# Patient Record
Sex: MALE | Race: ASIAN | NOT HISPANIC OR LATINO | URBAN - METROPOLITAN AREA
[De-identification: names, ages, dates, MRNs, and addresses within clinical notes are randomized per-mention and may not be internally consistent; named-entity substitution may affect disease eponyms.]

---

## 2020-10-05 ENCOUNTER — OUTPATIENT (OUTPATIENT)
Dept: OUTPATIENT SERVICES | Facility: HOSPITAL | Age: 25
LOS: 1 days | End: 2020-10-05

## 2020-10-05 VITALS
DIASTOLIC BLOOD PRESSURE: 80 MMHG | WEIGHT: 225.09 LBS | RESPIRATION RATE: 17 BRPM | OXYGEN SATURATION: 99 % | TEMPERATURE: 98 F | HEIGHT: 70 IN | HEART RATE: 72 BPM | SYSTOLIC BLOOD PRESSURE: 130 MMHG

## 2020-10-05 DIAGNOSIS — Z01.818 ENCOUNTER FOR OTHER PREPROCEDURAL EXAMINATION: ICD-10-CM

## 2020-10-05 DIAGNOSIS — D17.0 BENIGN LIPOMATOUS NEOPLASM OF SKIN AND SUBCUTANEOUS TISSUE OF HEAD, FACE AND NECK: ICD-10-CM

## 2020-10-05 DIAGNOSIS — D17.9 BENIGN LIPOMATOUS NEOPLASM, UNSPECIFIED: ICD-10-CM

## 2020-10-05 PROBLEM — Z00.00 ENCOUNTER FOR PREVENTIVE HEALTH EXAMINATION: Status: ACTIVE | Noted: 2020-10-05

## 2020-10-05 NOTE — H&P PST ADULT - NSICDXPROBLEM_GEN_ALL_CORE_FT
PROBLEM DIAGNOSES  Problem: Benign lipomatous neoplasm  Assessment and Plan: Preop instructions provided including npo status, Hibiclens wash for infection control and GI prophylasix. Pt aware to stop any NSAIDS, OTC herbals or MVI on 10/5/20. Verbilized understanding. BW done, pending results. DVT prophylasix as per primary team. States performed covid testing today at [facility] location.        PROBLEM DIAGNOSES  Problem: Benign lipomatous neoplasm  Assessment and Plan: Preop instructions provided including npo status, Hibiclens wash for infection control and GI prophylasix. Pt aware to stop any NSAIDS, OTC herbals or MVI on 10/5/20. Verbilized understanding. BW done, pending results. DVT prophylasix as per primary team. States went for covid testing today at [facility] location as arranged and told not to be on system so test not done. Pt. will call surgeons office tomorrow to get done asap.

## 2020-10-05 NOTE — H&P PST ADULT - NSANTHOSAYNRD_GEN_A_CORE
No. SHERMAN screening performed.  STOP BANG Legend: 0-2 = LOW Risk; 3-4 = INTERMEDIATE Risk; 5-8 = HIGH Risk/Denies sleep studies done in the past

## 2020-10-05 NOTE — H&P PST ADULT - HISTORY OF PRESENT ILLNESS
25 y/o male, presents to PST w/ a preop dx of benign lipomatous neoplasm of skin and subcutaneous tissue of head, face and neck and to be evaluated for a scheduled excision seb cyst scalp 6 cm on 10/8/20. Pt states 1 mo ago noted a cyst to head, itchy and antifungal med provided, ended 1 wk ago. Non tender, noted increase in size. seen by pcp and referred to Dr Lara and recommended surgical intervention at this time.

## 2020-10-05 NOTE — H&P PST ADULT - ASSESSMENT
DX: benign lipomatous neoplasm of skin and subcutaneous tissue of head, face and neck and evaluated for a scheduled excision seb cyst scalp 6 cm on 10/8/20.

## 2020-10-06 ENCOUNTER — APPOINTMENT (OUTPATIENT)
Dept: DISASTER EMERGENCY | Facility: CLINIC | Age: 25
End: 2020-10-06

## 2020-10-07 ENCOUNTER — TRANSCRIPTION ENCOUNTER (OUTPATIENT)
Age: 25
End: 2020-10-07

## 2020-10-07 VITALS
OXYGEN SATURATION: 99 % | TEMPERATURE: 98 F | RESPIRATION RATE: 18 BRPM | WEIGHT: 225.09 LBS | HEIGHT: 70 IN | HEART RATE: 100 BPM | SYSTOLIC BLOOD PRESSURE: 142 MMHG | DIASTOLIC BLOOD PRESSURE: 75 MMHG

## 2020-10-07 LAB — SARS-COV-2 N GENE NPH QL NAA+PROBE: NOT DETECTED

## 2020-10-08 ENCOUNTER — OUTPATIENT (OUTPATIENT)
Dept: OUTPATIENT SERVICES | Facility: HOSPITAL | Age: 25
LOS: 1 days | Discharge: HOME | End: 2020-10-08
Payer: MEDICAID

## 2020-10-08 ENCOUNTER — RESULT REVIEW (OUTPATIENT)
Age: 25
End: 2020-10-08

## 2020-10-08 VITALS
RESPIRATION RATE: 24 BRPM | SYSTOLIC BLOOD PRESSURE: 128 MMHG | OXYGEN SATURATION: 100 % | TEMPERATURE: 97 F | HEART RATE: 87 BPM | DIASTOLIC BLOOD PRESSURE: 88 MMHG

## 2020-10-08 DIAGNOSIS — D17.0 BENIGN LIPOMATOUS NEOPLASM OF SKIN AND SUBCUTANEOUS TISSUE OF HEAD, FACE AND NECK: ICD-10-CM

## 2020-10-08 PROCEDURE — 88304 TISSUE EXAM BY PATHOLOGIST: CPT | Mod: 26

## 2020-10-08 RX ORDER — OXYCODONE HYDROCHLORIDE 5 MG/1
1 TABLET ORAL
Qty: 10 | Refills: 0
Start: 2020-10-08

## 2020-10-08 RX ORDER — SODIUM CHLORIDE 9 MG/ML
1000 INJECTION INTRAMUSCULAR; INTRAVENOUS; SUBCUTANEOUS
Refills: 0 | Status: DISCONTINUED | OUTPATIENT
Start: 2020-10-08 | End: 2020-10-23

## 2020-10-08 NOTE — ASU DISCHARGE PLAN (ADULT/PEDIATRIC) - ASU DC SPECIAL INSTRUCTIONSFT
Your sebaceous cyst was noted to be infected. It was removed and closed with stitches that will need to be taken out later. Please take Augmentin for 7 days as prescribed for the infection.    Take Tylenol and ibuprofen every 6 hours (alternating every 3) for pain control. You likely will have a headache. Use oxycodone for breakthrough pain    You may shower tomorrow, no special precautions for wound care are needed.    Follow up with Dr Lackey in 1 week

## 2020-10-08 NOTE — ASU PREOP CHECKLIST - TEMPERATURE IN FAHRENHEIT (DEGREES F)
I have reviewed discharge instructions with the patient. The patient verbalized understanding. Patient left ED via Discharge Method: ambulatory to Home with self. Pt declined d/c v/s     Opportunity for questions and clarification provided. Patient given 0 scripts. To continue your aftercare when you leave the hospital, you may receive an automated call from our care team to check in on how you are doing. This is a free service and part of our promise to provide the best care and service to meet your aftercare needs.  If you have questions, or wish to unsubscribe from this service please call [phone]. Thank you for Choosing our [address] [place] [address] Emergency Department. 97.7

## 2020-10-08 NOTE — ASU DISCHARGE PLAN (ADULT/PEDIATRIC) - NURSING INSTRUCTIONS
Progress to regular diet as tolerated.  Keep well hydrated.    Narcotic pain medicine is constipating, buy over the counter stool softener and take as instructed on the bottle   *Next dose of Tylenol or Tylenol containing medication @ 10pm

## 2020-10-08 NOTE — ASU DISCHARGE PLAN (ADULT/PEDIATRIC) - FOLLOW UP APPOINTMENTS
[facility] ([facility]): 911 or go to the nearest Emergency Room

## 2020-10-08 NOTE — ASU DISCHARGE PLAN (ADULT/PEDIATRIC) - CALL YOUR DOCTOR IF YOU HAVE ANY OF THE FOLLOWING:
Pain not relieved by Medications/Fever greater than (need to indicate Fahrenheit or Celsius)/Bleeding that does not stop/Wound/Surgical Site with redness, or foul smelling discharge or pus/Swelling that gets worse

## 2020-10-08 NOTE — BRIEF OPERATIVE NOTE - OPERATION/FINDINGS
Sebaceous cyst noted to be infected with purulent drainage. Cyst bed very vascular. Specimen removed in pieces with bleeding from cyst bed. Surgicel placed and ligating sutures placed on scalp.

## 2020-10-08 NOTE — ASU DISCHARGE PLAN (ADULT/PEDIATRIC) - CARE PROVIDER_API CALL
Jessa Esqueda  SURGERY  [address] [place] [address]  Phone: [phone]  Fax: [phone]  Follow Up Time: 1 week

## 2020-10-08 NOTE — BRIEF OPERATIVE NOTE - NSICDXBRIEFPROCEDURE_GEN_ALL_CORE_FT
PROCEDURES:  Excision, cyst or benign neoplasm, radius, head or neck, with allograft 08-Oct-2020 16:57:08 Infected sebaceous cyst on scalp Lucretia Peñaloza

## 2020-10-12 LAB — SURGICAL PATHOLOGY STUDY: SIGNIFICANT CHANGE UP

## 2020-10-13 LAB
CULTURE RESULTS: SIGNIFICANT CHANGE UP
SPECIMEN SOURCE: SIGNIFICANT CHANGE UP

## 2020-10-22 ENCOUNTER — TRANSCRIPTION ENCOUNTER (OUTPATIENT)
Age: 25
End: 2020-10-22

## 2023-06-07 NOTE — ASU PREOPERATIVE ASSESSMENT, ADULT (IPARK ONLY) - WEIGHT IN LBS
225 Bilobed Transposition Flap Text: The defect edges were debeveled with a #15 scalpel blade.  Given the location of the defect and the proximity to free margins a bilobed transposition flap was deemed most appropriate.  Using a sterile surgical marker, an appropriate bilobe flap drawn around the defect.    The area thus outlined was incised deep to adipose tissue with a #15 scalpel blade.  The skin margins were undermined to an appropriate distance in all directions utilizing iris scissors.